# Patient Record
Sex: MALE | Race: OTHER | HISPANIC OR LATINO | URBAN - METROPOLITAN AREA
[De-identification: names, ages, dates, MRNs, and addresses within clinical notes are randomized per-mention and may not be internally consistent; named-entity substitution may affect disease eponyms.]

---

## 2019-04-26 ENCOUNTER — EMERGENCY (EMERGENCY)
Facility: HOSPITAL | Age: 44
LOS: 1 days | Discharge: HOME | End: 2019-04-26
Attending: EMERGENCY MEDICINE | Admitting: EMERGENCY MEDICINE
Payer: SELF-PAY

## 2019-04-26 VITALS
DIASTOLIC BLOOD PRESSURE: 85 MMHG | OXYGEN SATURATION: 100 % | TEMPERATURE: 98 F | RESPIRATION RATE: 20 BRPM | SYSTOLIC BLOOD PRESSURE: 134 MMHG | HEART RATE: 72 BPM

## 2019-04-26 VITALS
DIASTOLIC BLOOD PRESSURE: 75 MMHG | SYSTOLIC BLOOD PRESSURE: 118 MMHG | TEMPERATURE: 98 F | RESPIRATION RATE: 18 BRPM | OXYGEN SATURATION: 100 %

## 2019-04-26 LAB
ALBUMIN SERPL ELPH-MCNC: 3.9 G/DL — SIGNIFICANT CHANGE UP (ref 3.3–5)
ALP SERPL-CCNC: 96 U/L — SIGNIFICANT CHANGE UP (ref 40–120)
ALT FLD-CCNC: 25 U/L — SIGNIFICANT CHANGE UP (ref 10–45)
ANION GAP SERPL CALC-SCNC: 15 MMOL/L — SIGNIFICANT CHANGE UP (ref 5–17)
AST SERPL-CCNC: 22 U/L — SIGNIFICANT CHANGE UP (ref 10–40)
B-OH-BUTYR SERPL-SCNC: 0.1 MMOL/L — SIGNIFICANT CHANGE UP
BASE EXCESS BLDV CALC-SCNC: 1.3 MMOL/L — SIGNIFICANT CHANGE UP
BASOPHILS # BLD AUTO: 0.01 K/UL — SIGNIFICANT CHANGE UP (ref 0–0.2)
BASOPHILS NFR BLD AUTO: 0.2 % — SIGNIFICANT CHANGE UP (ref 0–2)
BILIRUB SERPL-MCNC: 0.5 MG/DL — SIGNIFICANT CHANGE UP (ref 0.2–1.2)
BUN SERPL-MCNC: 8 MG/DL — SIGNIFICANT CHANGE UP (ref 7–23)
CALCIUM SERPL-MCNC: 9.8 MG/DL — SIGNIFICANT CHANGE UP (ref 8.4–10.5)
CHLORIDE SERPL-SCNC: 106 MMOL/L — SIGNIFICANT CHANGE UP (ref 96–108)
CO2 SERPL-SCNC: 19 MMOL/L — LOW (ref 22–31)
CREAT SERPL-MCNC: 0.68 MG/DL — SIGNIFICANT CHANGE UP (ref 0.5–1.3)
EOSINOPHIL # BLD AUTO: 0 K/UL — SIGNIFICANT CHANGE UP (ref 0–0.5)
EOSINOPHIL NFR BLD AUTO: 0 % — SIGNIFICANT CHANGE UP (ref 0–6)
GLUCOSE SERPL-MCNC: 129 MG/DL — HIGH (ref 70–99)
HCO3 BLDV-SCNC: 22 MMOL/L — SIGNIFICANT CHANGE UP (ref 20–27)
HCT VFR BLD CALC: 41.6 % — SIGNIFICANT CHANGE UP (ref 39–50)
HGB BLD-MCNC: 12.7 G/DL — LOW (ref 13–17)
HIV 1+2 AB+HIV1 P24 AG SERPL QL IA: SIGNIFICANT CHANGE UP
IMM GRANULOCYTES NFR BLD AUTO: 0.8 % — SIGNIFICANT CHANGE UP (ref 0–1.5)
LACTATE SERPL-SCNC: 1.6 MMOL/L — SIGNIFICANT CHANGE UP (ref 0.5–2)
LACTATE SERPL-SCNC: 2.3 MMOL/L — HIGH (ref 0.5–2)
LYMPHOCYTES # BLD AUTO: 1.17 K/UL — SIGNIFICANT CHANGE UP (ref 1–3.3)
LYMPHOCYTES # BLD AUTO: 17.6 % — SIGNIFICANT CHANGE UP (ref 13–44)
MAGNESIUM SERPL-MCNC: 2.1 MG/DL — SIGNIFICANT CHANGE UP (ref 1.6–2.6)
MCHC RBC-ENTMCNC: 24.4 PG — LOW (ref 27–34)
MCHC RBC-ENTMCNC: 30.5 GM/DL — LOW (ref 32–36)
MCV RBC AUTO: 79.8 FL — LOW (ref 80–100)
MONOCYTES # BLD AUTO: 0.31 K/UL — SIGNIFICANT CHANGE UP (ref 0–0.9)
MONOCYTES NFR BLD AUTO: 4.7 % — SIGNIFICANT CHANGE UP (ref 2–14)
NEUTROPHILS # BLD AUTO: 5.12 K/UL — SIGNIFICANT CHANGE UP (ref 1.8–7.4)
NEUTROPHILS NFR BLD AUTO: 76.7 % — SIGNIFICANT CHANGE UP (ref 43–77)
NRBC # BLD: 0 /100 WBCS — SIGNIFICANT CHANGE UP (ref 0–0)
PCO2 BLDV: 26 MMHG — LOW (ref 41–51)
PH BLDV: 7.55 — HIGH (ref 7.32–7.43)
PLATELET # BLD AUTO: 238 K/UL — SIGNIFICANT CHANGE UP (ref 150–400)
PO2 BLDV: 27 MMHG — SIGNIFICANT CHANGE UP
POTASSIUM SERPL-MCNC: 3.9 MMOL/L — SIGNIFICANT CHANGE UP (ref 3.5–5.3)
POTASSIUM SERPL-SCNC: 3.9 MMOL/L — SIGNIFICANT CHANGE UP (ref 3.5–5.3)
PROT SERPL-MCNC: 8 G/DL — SIGNIFICANT CHANGE UP (ref 6–8.3)
RBC # BLD: 5.21 M/UL — SIGNIFICANT CHANGE UP (ref 4.2–5.8)
RBC # FLD: 15.4 % — HIGH (ref 10.3–14.5)
SAO2 % BLDV: 53 % — SIGNIFICANT CHANGE UP
SODIUM SERPL-SCNC: 140 MMOL/L — SIGNIFICANT CHANGE UP (ref 135–145)
WBC # BLD: 6.66 K/UL — SIGNIFICANT CHANGE UP (ref 3.8–10.5)
WBC # FLD AUTO: 6.66 K/UL — SIGNIFICANT CHANGE UP (ref 3.8–10.5)

## 2019-04-26 PROCEDURE — 87040 BLOOD CULTURE FOR BACTERIA: CPT

## 2019-04-26 PROCEDURE — 36415 COLL VENOUS BLD VENIPUNCTURE: CPT

## 2019-04-26 PROCEDURE — 80307 DRUG TEST PRSMV CHEM ANLYZR: CPT

## 2019-04-26 PROCEDURE — 71045 X-RAY EXAM CHEST 1 VIEW: CPT | Mod: 26

## 2019-04-26 PROCEDURE — 83605 ASSAY OF LACTIC ACID: CPT

## 2019-04-26 PROCEDURE — 99285 EMERGENCY DEPT VISIT HI MDM: CPT | Mod: 25

## 2019-04-26 PROCEDURE — 93010 ELECTROCARDIOGRAM REPORT: CPT

## 2019-04-26 PROCEDURE — 71045 X-RAY EXAM CHEST 1 VIEW: CPT

## 2019-04-26 PROCEDURE — 80053 COMPREHEN METABOLIC PANEL: CPT

## 2019-04-26 PROCEDURE — 70450 CT HEAD/BRAIN W/O DYE: CPT | Mod: 26

## 2019-04-26 PROCEDURE — 85025 COMPLETE CBC W/AUTO DIFF WBC: CPT

## 2019-04-26 PROCEDURE — 87389 HIV-1 AG W/HIV-1&-2 AB AG IA: CPT

## 2019-04-26 PROCEDURE — 99284 EMERGENCY DEPT VISIT MOD MDM: CPT | Mod: 25

## 2019-04-26 PROCEDURE — 83735 ASSAY OF MAGNESIUM: CPT

## 2019-04-26 PROCEDURE — 82962 GLUCOSE BLOOD TEST: CPT

## 2019-04-26 PROCEDURE — 70450 CT HEAD/BRAIN W/O DYE: CPT

## 2019-04-26 PROCEDURE — 82803 BLOOD GASES ANY COMBINATION: CPT

## 2019-04-26 PROCEDURE — 93005 ELECTROCARDIOGRAM TRACING: CPT

## 2019-04-26 PROCEDURE — 82010 KETONE BODYS QUAN: CPT

## 2019-04-26 RX ORDER — SODIUM CHLORIDE 9 MG/ML
1000 INJECTION INTRAMUSCULAR; INTRAVENOUS; SUBCUTANEOUS ONCE
Qty: 0 | Refills: 0 | Status: COMPLETED | OUTPATIENT
Start: 2019-04-26 | End: 2019-04-26

## 2019-04-26 RX ORDER — SODIUM CHLORIDE 9 MG/ML
1000 INJECTION INTRAMUSCULAR; INTRAVENOUS; SUBCUTANEOUS ONCE
Qty: 0 | Refills: 0 | Status: DISCONTINUED | OUTPATIENT
Start: 2019-04-26 | End: 2019-04-30

## 2019-04-26 RX ORDER — BUPRENORPHINE AND NALOXONE 2; .5 MG/1; MG/1
1 TABLET SUBLINGUAL ONCE
Qty: 0 | Refills: 0 | Status: DISCONTINUED | OUTPATIENT
Start: 2019-04-26 | End: 2019-04-26

## 2019-04-26 RX ADMIN — SODIUM CHLORIDE 1000 MILLILITER(S): 9 INJECTION INTRAMUSCULAR; INTRAVENOUS; SUBCUTANEOUS at 18:09

## 2019-04-26 RX ADMIN — BUPRENORPHINE AND NALOXONE 1 TABLET(S): 2; .5 TABLET SUBLINGUAL at 18:17

## 2019-04-26 NOTE — ED PROVIDER NOTE - CLINICAL SUMMARY MEDICAL DECISION MAKING FREE TEXT BOX
42 yo , in active withdrawal for Heroin, toxic appearing and dehydrated appearing + altered, cannot say year and poor historian, PLAN to evaluate also for infection / asp pna, for intracranial pathology, for DKA, for metabolic derangements. If symptoms still active in 2 hrs will repeat suboxone dose, max 2 doses on day 1. also discussed dosing with pharmacy .

## 2019-04-26 NOTE — ED PROVIDER NOTE - ENMT, MLM
Airway patent, Nasal mucosa clear. Mouth with normal mucosa. Throat has no vesicles, no oropharyngeal exudates and uvula is midline. POOR dentitian

## 2019-04-26 NOTE — ED PROVIDER NOTE - PROGRESS NOTE DETAILS
note, translation services used pt ambulatory, steady gait, tolerate PO and ready for discharge, [facility] his [facility].

## 2019-04-26 NOTE — ED PROVIDER NOTE - PSYCHIATRIC, MLM
Alert and oriented to person, place, DISORIENTED to time , . normal mood and affect. no apparent risk to self or others.

## 2019-04-26 NOTE — ED ADULT NURSE NOTE - OBJECTIVE STATEMENT
43yr/male presents to ED for medical evaluation. Patient is primarily Amharic speaking,  services used,  name Beto #[id number]. Patient reports generalized body pain and nausea. Patient reports heroine use, last use was yesterday. Patient restless during assessment. Clothing removed. Placed on cardiac monitor. 43yr/male presents to ED for medical evaluation. Patient is primarily Japanese speaking,  services used,  name Beto #[id number]. Patient reports generalized body pain and nausea. Patient reports heroine use, last use was yesterday. Patient restless during assessment. Denies any suicidal/ homicidal ideation.s Clothing removed. Placed on cardiac monitor. 43yr/male presents to ED for medical evaluation. Patient is primarily Slovenian speaking,  services used,  name Beto #[id number]. Patient awake and alert on arrival, oriented to person and place. Patient reports generalized body pain and nausea. Patient reports heroine use, last use was yesterday. Patient restless during assessment. Denies any suicidal/ homicidal ideation.s Clothing removed. Placed on cardiac monitor.

## 2019-04-26 NOTE — ED PROVIDER NOTE - NSFOLLOWUPINSTRUCTIONS_ED_ALL_ED_FT
I have discussed the discharge plan with the patient. The patient agrees with the plan, as discussed.  The patient understands Emergency Department diagnosis is a preliminary diagnosis often based on limited information and that the patient must adhere to the follow-up plan as discussed.  The patient understands that if the symptoms worsen or if prescribed medications do not have the desired/planned effect that the patient may return to the Emergency Department at any time for further evaluation and treatment.          POLYSUBSTANCE ABUSE - AfterCare(R) Instructions(ER/ED)     Polysubstance Abuse    WHAT YOU NEED TO KNOW:    Polysubstance abuse is the abuse of 2 or more drugs that cause impairment or distress. Examples include alcohol, nicotine, marijuana, cocaine, heroin, methamphetamine, hallucinogens such as mushrooms, or inhalants such as paint thinner. Prescribed medicines, such as opioids for pain or benzodiazepines for anxiety, are also commonly abused.    DISCHARGE INSTRUCTIONS:    Call 911 for any of the following:     You feel you might harm yourself or others.         Return to the emergency department if:     You have a seizure.       You have chest pain and your heart is beating faster than usual.       You have new shortness of breath.       You are dizzy and lightheaded.     Contact your healthcare provider or therapist if:     You are using drugs and think you are pregnant.       You have withdrawal symptoms and want to start using drugs again.       You have questions or concerns about your condition or care.     Risks of polysubstance abuse:     Drug dependence is when you continue to use drugs, even when you know the risks. Polysubstance abuse can damage your heart, brain, lungs, liver, and gastrointestinal tract. You continue even when it causes problems with work, school, or relationships. You may have difficulty finding or keeping a job because of your drug dependence.       Drug tolerance is when you need to use more drugs, or use them more often, to get the effects you want. You may not be able to stop using the drugs. When you try to stop, you may have withdrawal symptoms and strong cravings for the drugs.      Drug overdose can occur when you take more drugs than your body can handle. This may be a small amount or a large amount. You can lose consciousness or have a seizure or stroke. Your heart can stop beating, or you can stop breathing. You may die from a drug overdose.     Medicines:     Withdrawal medicines may be given according to the types of drugs you are abusing. Withdrawal from drugs can cause serious, life-threatening side effects. Certain medicines can help decrease your withdrawal symptoms and your desire for the drug. Ask for more information about the withdrawal medicines you may need.       Mood stabilizers may be given to help prevent or treat depression or anxiety caused by drug abuse and withdrawal.       Take your medicine as directed. Contact your healthcare provider if you think your medicine is not helping or if you have side effects. Tell him or her if you are allergic to any medicine. Keep a list of the medicines, vitamins, and herbs you take. Include the amounts, and when and why you take them. Bring the list or the pill bottles to follow-up visits. Carry your medicine list with you in case of an emergency.    Follow up with your healthcare provider as directed: You may be referred to a specialist to treat health conditions caused by your drug use. This includes mental health, heart, or lung specialists. Write down your questions so you remember to ask them during your visits.     Therapy: You may need therapy and support to stop using drugs:     Cognitive and behavioral therapy helps you change your thinking and behavior. It can help you develop plans to avoid the situations that make you want to use drugs. It also helps you cope with the feelings of wanting to use drugs. You may have individual or group therapy.       Contingency management helps you set drug-free goals with a therapist. You will decide ways to celebrate your success when you reach a goal.       Family therapy and support groups allow you and your family members to talk to and be encouraged by other people affected by drug abuse. You and your family members may attend together or separately. Ask your healthcare provider for information about programs in your area.     How polysubstance abuse affects unborn or  babies:     If you are pregnant or get pregnant while using drugs, you may have a miscarriage or give birth early. Your baby may be born addicted to the drugs.      Do not breastfeed your baby if you use drugs. Drugs pass from your bloodstream into your breast milk and affect your baby's health. Talk with your healthcare provider if you are using drugs and breastfeeding.    For support and more information:     Alcoholics Anonymous  Web Address: http://www.aa.org      National Clearinghouse on Drug and Alcohol Information  Phone: 2-517-4600505  Web Address: www.health.org      National [place] on Alcoholism and Drug Dependence  [address] [place], [place] [id number]  Phone: 1-978.786.9779  Phone: 1-213.465.4614  Web Address: http://www.ncadd.org

## 2019-04-26 NOTE — ED PROVIDER NOTE - OBJECTIVE STATEMENT
42 yo with ho of heroin abuse for 7 years, patient reports last use yesterday, reports it " was a bad batch" and felt sick afterwards, now reports withdrawal symptoms coming on , + diarrhea today, sweating, shaking, reports he cannot think clearly , no falls , no trauma, poor historian.  history of insulin dependant diabetes , last took yesterday,

## 2019-04-26 NOTE — ED PROVIDER NOTE - SHIFT CHANGE DETAILS
pt drastically improving , still does not know year, pending repeat Lactic, head CT, may repeat suboxone if needed, detox center info given,   dispo pending reevaluation however suspect discharge as improving at rapid rate.

## 2019-04-30 DIAGNOSIS — E11.9 TYPE 2 DIABETES MELLITUS WITHOUT COMPLICATIONS: ICD-10-CM

## 2019-04-30 DIAGNOSIS — R61 GENERALIZED HYPERHIDROSIS: ICD-10-CM

## 2019-04-30 DIAGNOSIS — R19.7 DIARRHEA, UNSPECIFIED: ICD-10-CM

## 2019-04-30 DIAGNOSIS — F17.200 NICOTINE DEPENDENCE, UNSPECIFIED, UNCOMPLICATED: ICD-10-CM

## 2019-04-30 DIAGNOSIS — F11.10 OPIOID ABUSE, UNCOMPLICATED: ICD-10-CM

## 2019-05-01 LAB
CULTURE RESULTS: SIGNIFICANT CHANGE UP
CULTURE RESULTS: SIGNIFICANT CHANGE UP
SPECIMEN SOURCE: SIGNIFICANT CHANGE UP
SPECIMEN SOURCE: SIGNIFICANT CHANGE UP

## 2021-12-23 NOTE — ED ADULT NURSE NOTE - NSIMPLEMENTINTERV_GEN_ALL_ED
Implemented All Fall Risk Interventions:  [place] to call system. Call bell, personal items and telephone within reach. Instruct patient to call for assistance. Room bathroom lighting operational. Non-slip footwear when patient is off stretcher. Physically safe environment: no spills, clutter or unnecessary equipment. Stretcher in lowest position, wheels locked, appropriate side rails in place. Provide visual cue, wrist band, yellow gown, etc. Monitor gait and stability. Monitor for mental status changes and reorient to person, place, and time. Review medications for side effects contributing to fall risk. Reinforce activity limits and safety measures with patient and family. numerical 0-10

## 2022-12-12 NOTE — ED ADULT NURSE NOTE - NS ED NURSE DC INFO COMPLEXITY
[FreeTextEntry1] : The patient is a 35-year-old G4, P3 premenopausal white female of [place] descent. She underwent menarche at age 10 and had her first child at age 29. She has a strong family history with her paternal aunt who had lung cancer at age 54 and her paternal great grandmother who had breast cancer at age 84. Her maternal great grandmother had rectal cancer at age 82 and then breast cancer at age 84 and then ovarian cancer at age 55. She has a maternal great aunt who had breast cancer at age 50 and her maternal grandfather had lung cancer at age 70. The patient had her first child in  with IVF and then had a failed IVF treatment in  and became pregnant with twins through IVF in 2018. She felt a left breast upper outer quadrant mass around the beginning of 2018 when she was 28 weeks pregnant with her twins and she was sent for an ultrasound which showed a suspicious density over the palpable region measuring 1.1 x 0.8 x 1.5 cm with a suspicious lower left axillary lymph node. Ultrasound-guided core biopsy showed a high-grade invasive duct cancer which was ER/CT negative HER-2/mal 3+ by IHC. Lymph node FNA also showed metastatic cancer. 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 She was seen by Dr. Gibbs at the [facility] and initially surgery was recommended. However, after discussing the case with Dr. Gibbs and the patient's gynecologist, Dr. Goode, it was decided for the patient to undergo neoadjuvant chemotherapy and she underwent AC through Dr. Brasher and then had a  delivering her twins and finished the AC chemotherapy and then Taxotere Herceptin and Perjeta postpartum. She had a follow-up MRI in 2019 showing a lesion in the right hepatic lobe which turned out to be hemangioma but there were also some enhancing focuses in the left breast retroareolar region and some clumped progressive enhancement in the right breast. Directed ultrasound showed a couple benign small fibroadenomas in the right breast and MRI biopsies were recommended in the right breast upper inner region and left breast retroareolar region. She decided to undergo bilateral total mastectomies and I also performed a localized sentinel lymph node biopsy in the left axilla and she had bilateral subpectoral direct to implant reconstructions with AlloDerm on 2019. Final pathology showed 4 negative sentinel lymph nodes with the node with the clip just showing fibrosis. The cancer was completely gone from the left breast on final pathology only showing hyalinizing fibrosis. She did have another 6 nodes removed on the left side making a total of 10 negative nodes. This was a clinical prognostic stage IIA breast cancer prior to neoadjuvant chemotherapy with a complete pathologic response after neoadjuvant chemotherapy. The patient was seen by Dr. Brasher and received external beam radiation through Dr. Salas Sullivan at [facility] which started on 2019 and finished on 2019. She finished Herceptin through Dr. Brasher on 2020 and he attempted to put her on neratinib but she could not tolerate it. She had her right sided Port-A-Cath removed and did develop some hypertrophic scarring. She did have a right breast mastopexy and revision of her implants by Dr. Cyr in 2020.  On exam today, I cannot feel any evidence of recurrence over the left implant.  She never underwent nipple reconstructions.  She did develop a palpable density felt over the lower aspect of the left implant and ended up undergoing an ultrasound and ultrasound-guided core biopsy at [facility] performed on April 15, 2022 which came back as fat necrosis which is likely due from her prior fat grafting.   She also lost about 40 pounds recently.  Follow up diagnostic left breast ultrasound was performed on ?????? which showed ??????.  The patient was reassured and should follow-up again in 6 months. She continues to follow up with Dr. Brasher.
Moderate: Comprehensive teaching